# Patient Record
Sex: FEMALE | Race: OTHER | Employment: FULL TIME | URBAN - METROPOLITAN AREA
[De-identification: names, ages, dates, MRNs, and addresses within clinical notes are randomized per-mention and may not be internally consistent; named-entity substitution may affect disease eponyms.]

---

## 2018-10-06 DIAGNOSIS — Z12.31 ENCOUNTER FOR SCREENING MAMMOGRAM FOR BREAST CANCER: Primary | ICD-10-CM

## 2022-11-08 ENCOUNTER — HOSPITAL ENCOUNTER (OUTPATIENT)
Dept: WOMENS IMAGING | Age: 67
Discharge: HOME | End: 2022-11-10
Payer: MEDICARE

## 2022-11-08 VITALS — HEIGHT: 64 IN

## 2022-11-08 DIAGNOSIS — Z12.31 BREAST CANCER SCREENING BY MAMMOGRAM: ICD-10-CM

## 2022-11-08 PROCEDURE — 77063 BREAST TOMOSYNTHESIS BI: CPT

## 2024-01-10 ENCOUNTER — HOSPITAL ENCOUNTER (EMERGENCY)
Age: 69
Discharge: HOME | End: 2024-01-10
Attending: STUDENT IN AN ORGANIZED HEALTH CARE EDUCATION/TRAINING PROGRAM
Payer: COMMERCIAL

## 2024-01-10 ENCOUNTER — APPOINTMENT (OUTPATIENT)
Dept: GENERAL RADIOLOGY | Age: 69
End: 2024-01-10
Payer: COMMERCIAL

## 2024-01-10 VITALS
RESPIRATION RATE: 18 BRPM | TEMPERATURE: 99.1 F | WEIGHT: 145 LBS | HEIGHT: 64 IN | HEART RATE: 78 BPM | SYSTOLIC BLOOD PRESSURE: 174 MMHG | DIASTOLIC BLOOD PRESSURE: 73 MMHG | BODY MASS INDEX: 24.75 KG/M2 | OXYGEN SATURATION: 98 %

## 2024-01-10 DIAGNOSIS — S63.501A SPRAIN OF RIGHT WRIST, INITIAL ENCOUNTER: ICD-10-CM

## 2024-01-10 DIAGNOSIS — M79.641 RIGHT HAND PAIN: Primary | ICD-10-CM

## 2024-01-10 PROCEDURE — 6370000000 HC RX 637 (ALT 250 FOR IP): Performed by: STUDENT IN AN ORGANIZED HEALTH CARE EDUCATION/TRAINING PROGRAM

## 2024-01-10 PROCEDURE — 99283 EMERGENCY DEPT VISIT LOW MDM: CPT

## 2024-01-10 PROCEDURE — 73130 X-RAY EXAM OF HAND: CPT

## 2024-01-10 RX ORDER — IBUPROFEN 400 MG/1
400 TABLET ORAL EVERY 6 HOURS PRN
Qty: 120 TABLET | Refills: 0 | Status: SHIPPED | OUTPATIENT
Start: 2024-01-10

## 2024-01-10 RX ORDER — ACETAMINOPHEN 500 MG
1000 TABLET ORAL ONCE
Status: COMPLETED | OUTPATIENT
Start: 2024-01-10 | End: 2024-01-10

## 2024-01-10 RX ORDER — ACETAMINOPHEN 500 MG
1000 TABLET ORAL EVERY 6 HOURS PRN
Qty: 60 TABLET | Refills: 0 | Status: SHIPPED | OUTPATIENT
Start: 2024-01-10

## 2024-01-10 RX ADMIN — ACETAMINOPHEN 1000 MG: 500 TABLET ORAL at 18:14

## 2024-01-10 ASSESSMENT — PAIN - FUNCTIONAL ASSESSMENT: PAIN_FUNCTIONAL_ASSESSMENT: 0-10

## 2024-01-10 ASSESSMENT — PAIN DESCRIPTION - DESCRIPTORS: DESCRIPTORS: ACHING

## 2024-01-10 ASSESSMENT — PAIN DESCRIPTION - ORIENTATION: ORIENTATION: RIGHT

## 2024-01-10 ASSESSMENT — PAIN DESCRIPTION - LOCATION: LOCATION: HAND

## 2024-01-10 ASSESSMENT — PAIN SCALES - GENERAL
PAINLEVEL_OUTOF10: 7
PAINLEVEL_OUTOF10: 7

## 2024-01-10 NOTE — ED TRIAGE NOTES
Pt presents to ER with hurting her right hand at work on Monday. Pt was carrying and iron cart and the board came down and hit her in the hand. Pt is not having any numbness or tingling. Pt has good color to the extremity with a strong radial pulse and cap refill <3 seconds. Pt has a noticeable knot on the top of the hand where the wrist meets. Pt took Advil this morning and it helped a little.

## 2024-01-10 NOTE — ED PROVIDER NOTES
ED Triage Vitals [01/10/24 1712]   BP Temp Temp Source Pulse Respirations SpO2 Height Weight - Scale   (!) 174/73 99.1 °F (37.3 °C) Oral 78 18 98 % 1.626 m (5' 4\") 65.8 kg (145 lb)       Physical Exam  Nursing note reviewed.   Constitutional:       General: She is not in acute distress.     Appearance: She is not ill-appearing.   HENT:      Head: Normocephalic and atraumatic.   Eyes:      Conjunctiva/sclera: Conjunctivae normal.   Cardiovascular:      Rate and Rhythm: Normal rate and regular rhythm.      Pulses: Normal pulses.   Pulmonary:      Effort: Pulmonary effort is normal.      Breath sounds: Normal breath sounds.   Abdominal:      Palpations: Abdomen is soft.      Tenderness: There is no abdominal tenderness.   Musculoskeletal:      Right wrist: Swelling, tenderness and bony tenderness present. No snuff box tenderness.      Right hand: Swelling, tenderness and bony tenderness present. Normal range of motion. Normal strength. Normal sensation. There is no disruption of two-point discrimination. Normal capillary refill. Normal pulse.        Hands:    Skin:     General: Skin is warm and dry.      Capillary Refill: Capillary refill takes less than 2 seconds.   Neurological:      Mental Status: She is alert and oriented to person, place, and time.      Sensory: No sensory deficit.      Motor: No weakness.   Psychiatric:         Mood and Affect: Mood is anxious.         MDM:   Chart Reviewed: PMH and additional information as noted in HPI obtained from outside chart review.    Vitals:    Vitals:    01/10/24 1712   BP: (!) 174/73   Pulse: 78   Resp: 18   Temp: 99.1 °F (37.3 °C)   TempSrc: Oral   SpO2: 98%   Weight: 65.8 kg (145 lb)   Height: 1.626 m (5' 4\")       PROCEDURES:  Unless otherwise noted below, none  Procedures    LABS:  Labs Reviewed - No data to display    XR HAND RIGHT (MIN 3 VIEWS)   Final Result   No acute osseous abnormality.         XR WRIST RIGHT (MIN 3 VIEWS)    (Results Pending)       XR

## 2024-01-24 ENCOUNTER — HOSPITAL ENCOUNTER (OUTPATIENT)
Dept: RADIOLOGY | Facility: CLINIC | Age: 69
Discharge: HOME | End: 2024-01-24
Payer: MEDICARE

## 2024-01-24 ENCOUNTER — OFFICE VISIT (OUTPATIENT)
Dept: PRIMARY CARE | Facility: CLINIC | Age: 69
End: 2024-01-24
Payer: MEDICARE

## 2024-01-24 VITALS
TEMPERATURE: 97.3 F | OXYGEN SATURATION: 100 % | HEART RATE: 61 BPM | HEIGHT: 64 IN | WEIGHT: 151.6 LBS | SYSTOLIC BLOOD PRESSURE: 118 MMHG | BODY MASS INDEX: 25.88 KG/M2 | RESPIRATION RATE: 16 BRPM | DIASTOLIC BLOOD PRESSURE: 72 MMHG

## 2024-01-24 DIAGNOSIS — Z23 NEED FOR SHINGLES VACCINE: ICD-10-CM

## 2024-01-24 DIAGNOSIS — Z13.89 SCREENING FOR MULTIPLE CONDITIONS: ICD-10-CM

## 2024-01-24 DIAGNOSIS — Z12.31 ENCOUNTER FOR SCREENING MAMMOGRAM FOR BREAST CANCER: ICD-10-CM

## 2024-01-24 DIAGNOSIS — M62.838 CERVICAL PARASPINAL MUSCLE SPASM: ICD-10-CM

## 2024-01-24 DIAGNOSIS — Z71.89 ADVANCED DIRECTIVES, COUNSELING/DISCUSSION: ICD-10-CM

## 2024-01-24 DIAGNOSIS — Z00.00 ANNUAL PHYSICAL EXAM: Primary | ICD-10-CM

## 2024-01-24 DIAGNOSIS — Z12.11 COLON CANCER SCREENING: ICD-10-CM

## 2024-01-24 PROCEDURE — 99497 ADVNCD CARE PLAN 30 MIN: CPT | Performed by: PHYSICIAN ASSISTANT

## 2024-01-24 PROCEDURE — 1158F ADVNC CARE PLAN TLK DOCD: CPT | Performed by: PHYSICIAN ASSISTANT

## 2024-01-24 PROCEDURE — 1170F FXNL STATUS ASSESSED: CPT | Performed by: PHYSICIAN ASSISTANT

## 2024-01-24 PROCEDURE — 1036F TOBACCO NON-USER: CPT | Performed by: PHYSICIAN ASSISTANT

## 2024-01-24 PROCEDURE — 1123F ACP DISCUSS/DSCN MKR DOCD: CPT | Performed by: PHYSICIAN ASSISTANT

## 2024-01-24 PROCEDURE — G0444 DEPRESSION SCREEN ANNUAL: HCPCS | Performed by: PHYSICIAN ASSISTANT

## 2024-01-24 PROCEDURE — G0439 PPPS, SUBSEQ VISIT: HCPCS | Performed by: PHYSICIAN ASSISTANT

## 2024-01-24 PROCEDURE — 1159F MED LIST DOCD IN RCRD: CPT | Performed by: PHYSICIAN ASSISTANT

## 2024-01-24 PROCEDURE — 72050 X-RAY EXAM NECK SPINE 4/5VWS: CPT | Performed by: STUDENT IN AN ORGANIZED HEALTH CARE EDUCATION/TRAINING PROGRAM

## 2024-01-24 PROCEDURE — 72050 X-RAY EXAM NECK SPINE 4/5VWS: CPT

## 2024-01-24 RX ORDER — IBUPROFEN 400 MG/1
1 TABLET ORAL EVERY 6 HOURS PRN
COMMUNITY
Start: 2024-01-10

## 2024-01-24 RX ORDER — CELECOXIB 200 MG/1
200 CAPSULE ORAL 2 TIMES DAILY
Qty: 180 CAPSULE | Refills: 1 | Status: SHIPPED | OUTPATIENT
Start: 2024-01-24

## 2024-01-24 RX ORDER — TIZANIDINE 4 MG/1
4 TABLET ORAL EVERY 8 HOURS PRN
Qty: 30 TABLET | Refills: 1 | Status: SHIPPED | OUTPATIENT
Start: 2024-01-24

## 2024-01-24 RX ORDER — ACETAMINOPHEN 500 MG
1000 TABLET ORAL
COMMUNITY
Start: 2024-01-10

## 2024-01-24 ASSESSMENT — PATIENT HEALTH QUESTIONNAIRE - PHQ9
2. FEELING DOWN, DEPRESSED OR HOPELESS: NOT AT ALL
1. LITTLE INTEREST OR PLEASURE IN DOING THINGS: NOT AT ALL
SUM OF ALL RESPONSES TO PHQ9 QUESTIONS 1 AND 2: 0

## 2024-01-24 ASSESSMENT — ENCOUNTER SYMPTOMS
LOSS OF SENSATION IN FEET: 0
OCCASIONAL FEELINGS OF UNSTEADINESS: 0
DEPRESSION: 0

## 2024-01-24 ASSESSMENT — ACTIVITIES OF DAILY LIVING (ADL)
BATHING: INDEPENDENT
MANAGING_FINANCES: INDEPENDENT
DRESSING: INDEPENDENT
DOING_HOUSEWORK: INDEPENDENT
TAKING_MEDICATION: INDEPENDENT
GROCERY_SHOPPING: INDEPENDENT

## 2024-01-24 NOTE — PROGRESS NOTES
Medicare Wellness Exam/Comprehensive Problem Focused Follow Up and Physical Exam  Chief Complaint   Patient presents with    New Patient Visit     She hasn't had a primary doctor is so long she doesn't remember who it is or how long ago.     Neck Pain     Pt is having a stiff neck and both shoulder pain every time she moves her head to the left or right she states it feels like a sharp sensation. She had tried massaging her neck and taking advil nothing is helping.      HPI:    Armenian speaking   Daughter Jorden Sanchez providing trans    Preventive:   - Lives at home in [place] by herself   - Employed: works in a greenhouse with plants   - Labs: DUE, ordered today   - Colon CA: DUE, ordered today   - Mamm: DUE, ordered today   - Flu: DUE   - Shingrix: DUE   - Pneumovax/ Prevnar: DUE   - Td: DUE   - COVID-19 vax: UTD     Neck pain:  - Onset: long time - a few months   - Description: b/o upper neck through shoulders and in the middle   - If wrorks with neck down for a long time, cleaning or planting it feels worse   - Clinical course: worsening   - Aggravated by:   - Rating severity: 8/10   - Treatments tried include: Advil (2 tablets) - it does help   - Right neck pain radiates down right arm to the elbow   - No numbness/ tingling/ burning  - Pt is right handed   - Previous imaging: no   - Previous physical therapy:         Assessment and Plan:  Problem List Items Addressed This Visit       Annual physical exam - Primary    Current Assessment & Plan     PREVENTIVE CARE SCREENING:  - Mood is good  - Home life is good, lives in [place] by herself   - 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 Increase healthy alternatives, whole grains, fruits vegetables.  - Encouraged to take daily multivitamin.    - Discussed EXERCISE -   Recommended weight training for bone health and 30 minutes of cardiovascular exercise 5-7 days a week.  - Encouraged pt to get yearly eye and dental exams          Relevant Orders    Comprehensive Metabolic Panel    Hemoglobin A1C    Lipid Panel    Cervical paraspinal muscle spasm    Current Assessment & Plan     - I educated the pt about the dx and tx options - will start with conservative tx   - Start antiinflammatory and muscle relaxer to take sparingly as needed.   - Recommend physical therapy if sxs worsen or persist   - Avoid activities that aggravate sxs, apply local heat, do light stretching exercises as tolerated.          Relevant Medications    celecoxib (CeleBREX) 200 mg capsule    tiZANidine (Zanaflex) 4 mg tablet    Advanced directives, counseling/discussion    Current Assessment & Plan     - I offered to discuss advanced directives with Ethel  today - she  voluntarily choose to proceed with the discussion   - Present for the discussion was: Daughter Shellie Sanchez   - I provided an explanation of living will, healthcare power of , DNR orders. Pt was given handout from [employer] to complete and return to be scanned into her medical record.  - If pt is unable to make their own medical decisions, she wishes for Shellie Colon to be consulted first as POA and son Bryce Colon as first alternative          Other Visit Diagnoses       Colon cancer screening        Relevant Orders    Colonoscopy Screening; Average Risk Patient    Encounter for screening mammogram for breast cancer        Relevant Orders    BI mammo bilateral screening tomosynthesis    Need for shingles vaccine                Active Problem List  Patient Active Problem List   Diagnosis    Annual physical exam    Cervical  paraspinal muscle spasm    Advanced directives, counseling/discussion       Comprehensive Medical/Surgical/Social/Family History  History reviewed. No pertinent past medical history.  Past Surgical History:   Procedure Laterality Date    HYSTERECTOMY  2005    MOUTH SURGERY  2007     Social History     Social History Narrative    Not on file     Tobacco/Alcohol/Opioid use, as well as Illicit Drug Use: no     Allergies and Medications  Patient has no known allergies.  Current Outpatient Medications   Medication Instructions    acetaminophen (TYLENOL) 1,000 mg, oral    celecoxib (CELEBREX) 200 mg, oral, 2 times daily    ibuprofen 400 mg tablet 1 tablet, oral, Every 6 hours PRN    tiZANidine (ZANAFLEX) 4 mg, oral, Every 8 hours PRN     Medications and Supplements  prescribed by me and other practitioners or clinical pharmacist (such as prescriptions, OTC's, herbal therapies and supplements) were reviewed and documented in the medical record.      Activities of Daily Living  In your present state of health, do you have any difficulty performing the following activities?:   Preparing food and eating?: No  Bathing yourself: No  Getting dressed: No  Using the toilet:No  Moving around from place to place: No  In the past year have you fallen or had a near fall?:No  Able to manage finances independently: Yes  Able to perform grocery shopping: Yes  Able to manage medications independently: Yes  Able to do housework independently: Yes  Patient self-assessment of health status? Good    Patient Care Team:  Irma Martins PA-C as PCP - General (Family Medicine)       Living situation: Lives in [place] by herself  Social support system: family - daughters, son  Current exercise habits: active    Dietary issues discussed: Yes  Hearing difficulties: No  Safe in current home environment: Yes  Visual Acuity assessed: No  Cognitive Impairment No    Depression Screening  Depression screening (15m) completed using the PHQ-2 questions with  results documented in the chart/encounter  (Rooming Screening section for documentation, or note for additional information)    Cardiac Risk Assessment  Cardiovascular risk was discussed and, if needed, lifestyle modifications recommended, including nutritional choices, exercise, and elimination of habits contributing to risk.   We agreed on a plan to reduce the current cardiovascular risk based on above discussion as needed.     Aspirin use/disuse was discussed and documented in the Problem List of the medical record (under Cardiac Risk Counseling) after reviewing the updated guidelines below:  Consider low dose Aspirin ( mg) use if the benefit for cardiovascular disease prevention outweighs risk for bleeding complications.   In general, low dose ASA should be considered:  In patients WITHOUT prior MI/stroke/PAD (primary prevention):   a. Age <60: Use if 10-year cardiovascular disease risk >20%, with discussion of risks and benefits with patient  b. Age 60-<70: Use if 10-year cardiovascular disease risk >20% and low bleeding (e.g., gastrointestinal) risk, with discussion of risks and benefits with patient  c. Age >=70: Do not use    In patients WITH prior MI/stroke/PAD (secondary prevention):   Generally use unless extremely high bleeding (e.g., gastrointenstinal) risk, with discussion of risks and benefits with patient    Advance Directives Discussion  Advanced Care Planning (including a Living Will, Healthcare POA, as well as specific end of life choices and/or directives), was discussed with the patient and/or surrogate, voluntarily, and details of that discussion documented in the Problem List (under Advanced Directives Discussion) of the medical record.    (~16 min spent discussing above)     ROS otherwise negative aside from what was mentioned above in HPI.    Vitals  /72 (BP Location: Right arm, Patient Position: Sitting, BP Cuff Size: Adult)   Pulse 61   Temp 36.3 °C (97.3 °F) (Temporal)    "Resp 16   Ht 1.626 m (5' 4\")   Wt 68.8 kg (151 lb 9.6 oz)   SpO2 100%   BMI 26.02 kg/m²   Body mass index is 26.02 kg/m².  Physical Exam  General:  alert, oriented, good hygiene, not disheveled. Well nourished.   No obvious skin rashes noted.   Normal gait    Mood is pleasant, not tearful, no signs of emotional distress.  Not appearing intoxicated or altered.   No voiced delusions or paranoia,    Normal, appropriate behavior.    HEENT: Normocephalic, atraumatic,   Pupils round, reactive to light  Extraocular motions intact and wnl  Tympanic membranes normal    Neck: No masses palpable.  No carotid bruits.  No thyromegaly.    Respiratory: Equal breath sounds  No wheezes, rales, rhonchi  No respiratory distress.    Heart: Regular rate and rhythm, no  murmurs      Abdomen: no masses palpable, no tenderness, rebound nor guarding.  no hernia or surgical scars    Extremities:   No edema     During the course of the visit the patient was educated and counseled about age appropriate screening and preventive services. Completed preventive screenings were documented in the chart and orders were placed for outstanding screenings/procedures as documented in the Assessment and Plan.    Patient Instructions (the written plan) was given to the patient at check out.  "

## 2024-01-26 PROBLEM — M62.838 CERVICAL PARASPINAL MUSCLE SPASM: Status: ACTIVE | Noted: 2024-01-26

## 2024-01-26 PROBLEM — Z71.89 ADVANCED DIRECTIVES, COUNSELING/DISCUSSION: Status: ACTIVE | Noted: 2024-01-26

## 2024-01-26 NOTE — ASSESSMENT & PLAN NOTE
- I offered to discuss advanced directives with Ethel  today - she  voluntarily choose to proceed with the discussion   - Present for the discussion was: Daughter Shellie Sanchez   - I provided an explanation of living will, healthcare power of , DNR orders. Pt was given handout from [employer] to complete and return to be scanned into her medical record.  - If pt is unable to make their own medical decisions, she wishes for Shellie Colon to be consulted first as POA and son Bryce Colon as first alternative

## 2024-01-26 NOTE — ASSESSMENT & PLAN NOTE
- I educated the pt about the dx and tx options - will start with conservative tx   - Start antiinflammatory and muscle relaxer to take sparingly as needed.   - Recommend physical therapy if sxs worsen or persist   - Avoid activities that aggravate sxs, apply local heat, do light stretching exercises as tolerated.

## 2024-01-26 NOTE — ASSESSMENT & PLAN NOTE
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 Increase healthy alternatives, whole grains, fruits vegetables.  - Encouraged to take daily multivitamin.    - Discussed EXERCISE -   Recommended weight training for bone health and 30 minutes of cardiovascular exercise 5-7 days a week.  - Encouraged pt to get yearly eye and dental exams

## 2024-01-27 ENCOUNTER — LAB (OUTPATIENT)
Dept: LAB | Facility: LAB | Age: 69
End: 2024-01-27
Payer: MEDICARE

## 2024-01-27 DIAGNOSIS — Z00.00 ANNUAL PHYSICAL EXAM: ICD-10-CM

## 2024-01-27 LAB
ALBUMIN SERPL BCP-MCNC: 4.2 G/DL (ref 3.4–5)
ALP SERPL-CCNC: 56 U/L (ref 33–136)
ALT SERPL W P-5'-P-CCNC: 10 U/L (ref 7–45)
ANION GAP SERPL CALC-SCNC: 13 MMOL/L (ref 10–20)
AST SERPL W P-5'-P-CCNC: 13 U/L (ref 9–39)
BILIRUB SERPL-MCNC: 0.6 MG/DL (ref 0–1.2)
BUN SERPL-MCNC: 16 MG/DL (ref 6–23)
CALCIUM SERPL-MCNC: 9 MG/DL (ref 8.6–10.3)
CHLORIDE SERPL-SCNC: 102 MMOL/L (ref 98–107)
CHOLEST SERPL-MCNC: 222 MG/DL (ref 0–199)
CHOLESTEROL/HDL RATIO: 4.2
CO2 SERPL-SCNC: 28 MMOL/L (ref 21–32)
CREAT SERPL-MCNC: 0.59 MG/DL (ref 0.5–1.05)
EGFRCR SERPLBLD CKD-EPI 2021: >90 ML/MIN/1.73M*2
GLUCOSE SERPL-MCNC: 96 MG/DL (ref 74–99)
HDLC SERPL-MCNC: 52.4 MG/DL
LDLC SERPL CALC-MCNC: 147 MG/DL
NON HDL CHOLESTEROL: 170 MG/DL (ref 0–149)
POTASSIUM SERPL-SCNC: 4.3 MMOL/L (ref 3.5–5.3)
PROT SERPL-MCNC: 7.5 G/DL (ref 6.4–8.2)
SODIUM SERPL-SCNC: 139 MMOL/L (ref 136–145)
TRIGL SERPL-MCNC: 111 MG/DL (ref 0–149)
VLDL: 22 MG/DL (ref 0–40)

## 2024-01-27 PROCEDURE — 80061 LIPID PANEL: CPT

## 2024-01-27 PROCEDURE — 36415 COLL VENOUS BLD VENIPUNCTURE: CPT

## 2024-01-27 PROCEDURE — 83036 HEMOGLOBIN GLYCOSYLATED A1C: CPT

## 2024-01-27 PROCEDURE — 80053 COMPREHEN METABOLIC PANEL: CPT

## 2024-01-28 LAB
EST. AVERAGE GLUCOSE BLD GHB EST-MCNC: 120 MG/DL
HBA1C MFR BLD: 5.8 %

## 2024-02-19 ENCOUNTER — HOSPITAL ENCOUNTER (OUTPATIENT)
Dept: OCCUPATIONAL THERAPY | Age: 69
Setting detail: THERAPIES SERIES
Discharge: HOME | End: 2024-02-19
Payer: COMMERCIAL

## 2024-02-19 PROCEDURE — 97166 OT EVAL MOD COMPLEX 45 MIN: CPT

## 2024-02-19 NOTE — THERAPY EVALUATION
& O x 3 = 0  Disoriented to person, place, or time = 2     [x]  INITIAL ASSESSMENT:                                                      Date: 2/19/2024                                                  Age:   1                                                         Falls: 0                                                          PMH: 0                                                          Mental: 0                                                       Total:  1                                                        *Patient 4 or younger:   Vestibular:     Signature: MARI Mera/ROBIN                                                      High Risk for Falls: >8  Intermediate Risk for Falls: 4-8   Low Risk for Falls: <4    * All pediatric patients (age 4 or younger) and vestibular patients will be considered high risk for falls- scoring does not need to be completed - treat as high risk.  Interventions     Low Risk: Monitor for changes, reassess every three months.  Intermediate Risk: Supervision for most activities, direct contact with new activities or equipment, reassess monthly.  High Risk: Stand by assitance at all times, reassess monthly.     The following patient has been evaluated for occupational therapy services. For therapy to continue, insurance requires physician review of the treatment plan. Please review the attached evaluation and/or summary of the patient's plan of care, and verify that you agree therapy should continue by signing the attached document and sending it back to our office. Thank you for this referral.    Physician signature____________________________________     Date________________

## 2024-02-21 ENCOUNTER — HOSPITAL ENCOUNTER (OUTPATIENT)
Dept: OCCUPATIONAL THERAPY | Age: 69
Setting detail: THERAPIES SERIES
Discharge: HOME | End: 2024-02-21
Payer: COMMERCIAL

## 2024-02-21 PROCEDURE — 97140 MANUAL THERAPY 1/> REGIONS: CPT

## 2024-02-21 PROCEDURE — 97530 THERAPEUTIC ACTIVITIES: CPT

## 2024-02-21 NOTE — PROGRESS NOTES
[facility]     Occupational Therapy: Daily Note   Patient: Asuncion Ochoa (68 y.o. female)   Date: 2024  Plan of Care Certification Period:  24   :  1955  MRN: [id number]  CSN: [id number]   Insurance: Payor: [employer] / Plan: [employer] / Product Type: *No Product type* /   Insurance ID: [id number] - (Worker's Comp) Secondary Insurance (if applicable):    Referring Physician: Arslan Chow DO     PCP: Ting Zhang PA Visits to Date: Total # of Visits to Date: 2   Progress note:Progress Note Counter: 2/10  Visits Approved: 10    No Show:    Cancelled Appts:      Medical Diagnosis: Contusion of right hand, initial encounter [S60.221A]  Contusion of right wrist, initial encounter [S60.211A] R hand contusion        Therapy Time     Time in 1603   Time out 1700   Total treatment minutes 57   Total time code minutes  57 Minutes        OT Manual therapy 24 minutes for 2 unit(s), CPT 89693  OT Therapeutic activities 33 minutes for 2 unit(s), CPT 70591  Modality Time In Time Out Total Minutes Units   Therapeutic Activity 1603 1623 20 1   Manual Therapy  1623 1647 24 2   Therapeutic Activity 1647 1700 13 1         SUBJECTIVE EXAMINATION     Patient's date of birth confirmed: Yes     Pain Level:   3/10  Location: dorsal R wrist  Description: sore, aches, sometimes sharp    Patient Comments:   \"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 Daughter present, pt. requested daughter as she felt she is able to understand most of English, but prefers to speak Pakistani.     HEP/Strategies/Orthosis Compliance: N/A      Restrictions: none      OBJECTIVE EXAMINATION     TREATMENT     Focus of treatment was on the following:   stabilizing R wrist, decreasing fascial/soft tissue restrictions, and decreasing pain     MFR/Manual:   After

## 2024-02-26 ENCOUNTER — HOSPITAL ENCOUNTER (OUTPATIENT)
Dept: OCCUPATIONAL THERAPY | Age: 69
Setting detail: THERAPIES SERIES
Discharge: HOME | End: 2024-02-26
Payer: COMMERCIAL

## 2024-02-26 PROCEDURE — 97530 THERAPEUTIC ACTIVITIES: CPT

## 2024-02-26 PROCEDURE — 97140 MANUAL THERAPY 1/> REGIONS: CPT

## 2024-02-26 NOTE — PROGRESS NOTES
[facility]     Occupational Therapy: Daily Note   Patient: Asuncion Ochoa (68 y.o. female)   Date: 2024  Plan of Care Certification Period:  24   :  1955  MRN: [id number]  CSN: [id number]   Insurance: Payor: [employer] / Plan: [employer] / Product Type: *No Product type* /   Insurance ID: [id number] - (Worker's Comp) Secondary Insurance (if applicable):    Referring Physician: Arslan Chow DO     PCP: Ting Zhang PA Visits to Date: Total # of Visits to Date: 3   Progress note:Progress Note Counter: 3/10  Visits Approved: 10    No Show:    Cancelled Appts:      Medical Diagnosis: Contusion of right hand, initial encounter [S60.221A]  Contusion of right wrist, initial encounter [S60.211A] R hand contusion        Therapy Time     Time in 1603   Time out 1700   Total treatment minutes 57   Total time code minutes  57 Minutes        OT Manual therapy 8 minutes for 1 unit(s), CPT 79100  OT Therapeutic activities 49 minutes for 3 unit(s), CPT 62001  Modality Time In Time Out Total Minutes Units   Therapeutic Activity fluidotherapy w/ RUE AROM wrist 1603 1623 20 1   Manual Therapy  1623 1631 8 1   Therapeutic Activity 1631 1700 29 2      SUBJECTIVE EXAMINATION     Patient's date of birth confirmed: Yes     Pain Level:   Pt denies pain  Location: NA  Description: NA    Patient Comments:   \"I only have pain if I flex my wrist real far.\"    Learning/Language barrier: yes, patient reported English is secondary language, declined .  Pt. able to follow all therapist's verbal directions, utilized daughter to interpret her responses.     HEP/Strategies/Orthosis Compliance: N/A      Restrictions: none         OBJECTIVE EXAMINATION         TREATMENT     Focus of treatment was on the following:   improving functional ROM R wrist and decreasing fascial/soft tissue restrictions     MFR/Manual:   After fluidotherapy, provided pt. with soft tissue mobilization of

## 2024-02-28 ENCOUNTER — HOSPITAL ENCOUNTER (OUTPATIENT)
Dept: OCCUPATIONAL THERAPY | Age: 69
Setting detail: THERAPIES SERIES
Discharge: HOME | End: 2024-02-28
Payer: COMMERCIAL

## 2024-02-28 PROCEDURE — 97530 THERAPEUTIC ACTIVITIES: CPT

## 2024-02-28 NOTE — PROGRESS NOTES
[facility]     Occupational Therapy: Daily Note   Patient: Asuncion Ochoa (68 y.o. female)   Date: 2024  Plan of Care Certification Period:  24   :  1955  MRN: [id number]  CSN: [id number]   Insurance: Payor: [employer] / Plan: [employer] / Product Type: *No Product type* /   Insurance ID: [id number] - (Worker's Comp) Secondary Insurance (if applicable):    Referring Physician: Arslan Chow DO     PCP: Ting Zhang PA Visits to Date: Total # of Visits to Date: 4   Progress note:Progress Note Counter: 4/10  Visits Approved: 10    No Show:    Cancelled Appts:      Medical Diagnosis: Contusion of right hand, initial encounter [S60.221A]  Contusion of right wrist, initial encounter [S60.211A] R hand contusion        Therapy Time     Time in 1600   Time out 1655   Total treatment minutes 55   Total time code minutes  55 Minutes        OT Therapeutic activities 55 minutes for 4 unit(s), CPT 03543  fluidotherapy RUE 4:00-4:20 pm     SUBJECTIVE EXAMINATION     Patient's date of birth confirmed: Yes     Pain Level:   1/10-2/10  Location: ulnar side of R hand  Description: sore, ache    Patient Comments:   \"I didn't do too much at work, so I am okay today.\"    Learning/Language barrier: yes, patient reported English is secondary language, declined .  Pt. is able to understand English, but prefers to speak Turkmen.  Followed all commands w/ occasional interpretation from daughter.     HEP/Strategies/Orthosis Compliance: Patient verbally confirmed compliant with HEP's     Restrictions: none       OBJECTIVE EXAMINATION     TREATMENT     Focus of treatment was on the following:   strengthening R hand, managing pain and improving stability of ulnar side of hand.      Exercises/Activities:  Isometric ex. digit ab/adduction squeezing small blue sponge between each digit x 5 reps each  Isolated digit extension AROM w/ palm flat on table, 5 reps each

## 2024-03-04 ENCOUNTER — HOSPITAL ENCOUNTER (OUTPATIENT)
Dept: OCCUPATIONAL THERAPY | Age: 69
Setting detail: THERAPIES SERIES
Discharge: HOME | End: 2024-03-04
Payer: COMMERCIAL

## 2024-03-04 PROCEDURE — 97035 APP MDLTY 1+ULTRASOUND EA 15: CPT

## 2024-03-04 PROCEDURE — 97140 MANUAL THERAPY 1/> REGIONS: CPT

## 2024-03-04 PROCEDURE — 97530 THERAPEUTIC ACTIVITIES: CPT

## 2024-03-04 NOTE — PROGRESS NOTES
[facility]CC[facility]ER[facility]     Occupational Therapy: Daily Note   Patient: Asuncion Ochoa (68 y.o. female)   Date: 2024  Plan of Care Certification Period:  24   :  1955  MRN: [id number]  CSN: [id number]   Insurance: Payor: [employer] / Plan: [employer] / Product Type: *No Product type* /   Insurance ID: [id number] - (Worker's Comp) Secondary Insurance (if applicable):    Referring Physician: Arslan Chow DO     PCP: Ting Zhang PA Visits to Date: Total # of Visits to Date: 5   Progress note:Progress Note Counter: 5/10  Visits Approved: 10    No Show:    Cancelled Appts:      Medical Diagnosis: Contusion of right hand, initial encounter [S60.221A]  Contusion of right wrist, initial encounter [S60.211A] R hand contusion        Therapy Time     Time in 1605   Time out 1659   Total treatment minutes 54   Total time code minutes  54 Minutes        OT Manual therapy 9 minutes for 1 unit(s), CPT 76137  OT Therapeutic activities 37 minutes for 2 unit(s), CPT 33363  OT Ultrasound 8 minutes for 1 unit(s), CPT 91151     Modality Time In Time Out Total Minutes Units   Manual therapy 1605 1614 9 1   therapeutic Activity 1614 1651 37 2   ultrasound 1651 1659 8 1      SUBJECTIVE EXAMINATION     Patient's date of birth confirmed: Yes     Pain Level:   /10  Location: dorsal hand radiating through center of R wrist.  Description: \"catching, sharp pain\" especially when grasps and turns hand.    Patient Comments:   \"I move my fingers a lot because I feel like something gets stuck, like a tendon.  So moving it helps get rid of the ache and helps it move.\"    Learning/Language barrier: yes, patient reported English is secondary language, declined .   Pt. understands English, however has daughter interpret her responses in Wolof.    HEP/Strategies/Orthosis Compliance: N/A          Restrictions: none per patient reports           OBJECTIVE EXAMINATION         TREATMENT

## 2024-03-06 ENCOUNTER — HOSPITAL ENCOUNTER (OUTPATIENT)
Dept: OCCUPATIONAL THERAPY | Age: 69
Setting detail: THERAPIES SERIES
Discharge: HOME | End: 2024-03-06
Payer: COMMERCIAL

## 2024-03-06 PROCEDURE — 97530 THERAPEUTIC ACTIVITIES: CPT

## 2024-03-06 NOTE — PROGRESS NOTES
performed red theraputty HEP.  10 reps x 5 ex      Patient Education/HEP:   hand strengthening: Patient issued red theraputty., Pt completed 10 reps to demo understanding. Pt with good follow through., Written hand out(s) provided in Chinese.        ASSESSMENT       Assessment: Pt tolerated treatment well  No increase in pain with increased resistance.  Pt. continues to have small bump over 4th MC R hand and reports occasional \"painful catching\" of tendon with making fist.  Pt. demo good understanding of HEP.     Post Treatment Pain: Pt denies pain    Patient's Activity Tolerance: excellent                 GOALS         Long Term Goals  Time Frame for Long Term Goals : 2 x 5 wks/ 10 visits  Long Term Goal 1: Pt. will be able to perform all ADL using positioning techniques w/ pain at 2-3/10 level or less.  Long Term Goal 2: Patient will report pain 2-3 or less during functional activities.  Long Term Goal 3: Patient  will be indep with all recommended HEP's, adaptive strategies, and adaptive techniques.  Long Term Goal 4: Patient  will increase RUE strength from current by 1/2 muscle grade  to increase performance with I/ADL's.  Long Term Goal 5: Patient  will increase R  strength from current by 5-8 lbs to increase performance with I/ADL's.  Long Term Goal 6: Pt. will be able to write legibly without pain to perform her leisure tasks.         TREATMENT PLAN   Continue POC           Electronically signed by MINGO Mera  on 3/6/2024 at 5:02 PM

## 2024-03-11 ENCOUNTER — HOSPITAL ENCOUNTER (OUTPATIENT)
Dept: OCCUPATIONAL THERAPY | Age: 69
Setting detail: THERAPIES SERIES
Discharge: HOME | End: 2024-03-11
Payer: COMMERCIAL

## 2024-03-11 NOTE — PROGRESS NOTES
Therapy                            Cancellation/No-show Note    Date: 3/11/2024  Patient Name: Asuncion Ochoa    : 1955  (68 y.o.)     MRN: [id number]    Account #: [id number]       Canceled Appointment: 1    Comments:   called to cancel appt. for pt. at 4:10 for 4:00 pm appt.    For today's appointment patient:  [x]  Cancelled  []  Rescheduled appointment  []  No-show   []  Called pt to remind of next appointment     Reason given by patient:  [x]  Patient ill  []  Conflicting appointment  []  No transportation    []  Conflict with work  []  No reason given  []  Other:      [x] Pt has future appointments scheduled, no follow up needed  [] Pt requests to be on hold.    Reason:   If > 2 weeks please discuss with therapist.  [] Therapist to call pt for follow up     Signature: MARI Mera/L 3/11/2024 4:33 PM

## 2024-03-13 ENCOUNTER — HOSPITAL ENCOUNTER (OUTPATIENT)
Dept: OCCUPATIONAL THERAPY | Age: 69
Setting detail: THERAPIES SERIES
Discharge: HOME | End: 2024-03-13
Payer: COMMERCIAL

## 2024-03-13 PROCEDURE — 97530 THERAPEUTIC ACTIVITIES: CPT

## 2024-03-13 NOTE — PROGRESS NOTES
[facility]     Occupational Therapy: Daily Note   Patient: Asuncion Ochoa (68 y.o. female)   Date: 2024  Plan of Care Certification Period:  24   :  1955  MRN: [id number]  CSN: [id number]   Insurance: Payor: [employer] / Plan: [employer] / Product Type: *No Product type* /   Insurance ID: [id number] - (Worker's Comp) Secondary Insurance (if applicable):    Referring Physician: Arslan Chow DO     PCP: Ting Zhang PA Visits to Date: Total # of Visits to Date: 7   Progress note:Progress Note Counter: 7/10  Visits Approved: 10    No Show:    Cancelled Appts:1     Medical Diagnosis: Contusion of right hand, initial encounter [S60.221A]  Contusion of right wrist, initial encounter [S60.211A] R hand contusion        Therapy Time     Time in 1601   Time out 1654   Total treatment minutes 53   Total time code minutes  53 Minutes        OT Therapeutic activities 53 minutes for 4 unit(s), CPT 29128       SUBJECTIVE EXAMINATION     Patient's date of birth confirmed: Yes     Pain Level:   Pt denies pain  Location: NA  Description: NA    Patient Comments:   \"I am not having any pain, I am not doing anything at work that I don't feel I can do.  My hand has gotten a lot better.\"    Learning/Language barrier: yes, patient reported English is secondary language, declined .   Pt. understands English very well and speaks some.  Able to follow without difficulty.    HEP/Strategies/Orthosis Compliance: Patient verbally confirmed compliant with HEP's Patient demo understanding.        Restrictions: none        OBJECTIVE EXAMINATION       TREATMENT     Focus of treatment was on the following:   strengthening RUE      Exercises/Activities:  Wrist PREs w/ 2 lb. dumbbell, UE supported on wedge x 15 reps each:            wrist flex/ext       rad/ulnar dev       sup/pro       Wrist ext w/ fingertip  on end of weight  1 lb hammer x 15 reps:  Rapid forearm  Permanent, continue rate control and anticoagulation.

## 2024-03-18 ENCOUNTER — HOSPITAL ENCOUNTER (OUTPATIENT)
Dept: OCCUPATIONAL THERAPY | Age: 69
Setting detail: THERAPIES SERIES
Discharge: HOME | End: 2024-03-18
Payer: COMMERCIAL

## 2024-03-18 NOTE — PROGRESS NOTES
Therapy                            Cancellation/No-show Note    Date: 3/18/2024  Patient Name: Asuncion Ochoa    : 1955  (68 y.o.)     MRN: [id number]    Account #: [id number]       Canceled Appointment: 2    Comments:      For today's appointment patient:  [x]  Cancelled  []  Rescheduled appointment  []  No-show   []  Called pt to remind of next appointment     Reason given by patient:  [x]  Patient ill/migraine  []  Conflicting appointment  []  No transportation    []  Conflict with work  []  No reason given  []  Other:      [x] Pt has future appointments scheduled, no follow up needed  [] Pt requests to be on hold.    Reason:   If > 2 weeks please discuss with therapist.  [] Therapist to call pt for follow up     Signature: MARI Mera/L 3/18/2024 2:24 PM

## 2024-03-20 ENCOUNTER — HOSPITAL ENCOUNTER (OUTPATIENT)
Dept: OCCUPATIONAL THERAPY | Age: 69
Setting detail: THERAPIES SERIES
Discharge: HOME | End: 2024-03-20
Payer: COMMERCIAL

## 2024-03-20 PROCEDURE — 97530 THERAPEUTIC ACTIVITIES: CPT

## 2024-03-21 NOTE — PROGRESS NOTES
OCCUPATIONAL THERAPY DISCHARGE SUMMARY     [facility]    [address] [place] [address]  Ph: [phone]   Fax: [phone]      Date: 3/21/2024    To: Arslan Chow DO From: Lorie Xiong, OTR/L   Patient: Asuncion Ohcoa : 1955   MRN: [id number] Diagnosis: Contusion of right hand, initial encounter [S60.221A]  Contusion of right wrist, initial encounter [S60.211A] Diagnosis: R hand contusion     Date of eval: 2024    Visit Information:   [place]set Date: 01/10/24  OT Insurance Information: [employer]  Total # of Visits Approved: 10  Total # of Visits to Date: 8  Certification Period Expiration Date: 24  Progress Note Due Date: 24  Canceled Appointment: 2  Progress Note Counter: 8/10                              Assessment:      Long Term Goals Current/Discharge status  Met   Long Term Goal 1: Pt. will be able to perform all ADL using positioning techniques w/ pain at 2-3/10 level or less. Pt. reports no pain issues with ADL and work tasks. [x] Met  [] Partially Met  [] Not Met   Long Term Goal 2: Patient will report pain 2-3 or less during functional activities. No pain issues [x] Met  [] Partially Met  [] Not Met   Long Term Goal 3: Patient  will be indep with all recommended HEP's, adaptive strategies, and adaptive techniques.  [x] Met  [] Partially Met  [] Not Met   Long Term Goal 4: Patient  will increase RUE strength from current by 1/2 muscle grade  to increase performance with I/ADL's. See below [x] Met  [] Partially Met  [] Not Met   Long Term Goal 5: Patient  will increase R  strength from current by 5-8 lbs to increase performance with I/ADL's. See below [x] Met  [] Partially Met  [] Not Met   Long Term Goal 6: Pt. will be able to write legibly without pain to perform her leisure tasks. Pt. reports she is writing daily without issues, able to stir during cooking tasks. [x] Met  [] Partially Met  [] Not Met     Pain:                 Pain Location  Description 
functional activities.  Long Term Goal 3: Patient  will be indep with all recommended HEP's, adaptive strategies, and adaptive techniques.  Long Term Goal 4: Patient  will increase RUE strength from current by 1/2 muscle grade  to increase performance with I/ADL's.  Long Term Goal 5: Patient  will increase R  strength from current by 5-8 lbs to increase performance with I/ADL's.  Long Term Goal 6: Pt. will be able to write legibly without pain to perform her leisure tasks.         TREATMENT PLAN   D/C from OP OT           Electronically signed by MARI Mera/L  on 3/20/2024 at 5:10 PM